# Patient Record
Sex: FEMALE | Race: WHITE | NOT HISPANIC OR LATINO | Employment: UNEMPLOYED | URBAN - NONMETROPOLITAN AREA
[De-identification: names, ages, dates, MRNs, and addresses within clinical notes are randomized per-mention and may not be internally consistent; named-entity substitution may affect disease eponyms.]

---

## 2017-08-14 ENCOUNTER — LAB (OUTPATIENT)
Dept: LAB | Facility: HOSPITAL | Age: 22
End: 2017-08-14

## 2017-08-14 ENCOUNTER — OFFICE VISIT (OUTPATIENT)
Dept: CARDIOLOGY | Facility: CLINIC | Age: 22
End: 2017-08-14

## 2017-08-14 VITALS
OXYGEN SATURATION: 96 % | DIASTOLIC BLOOD PRESSURE: 58 MMHG | SYSTOLIC BLOOD PRESSURE: 102 MMHG | BODY MASS INDEX: 14.28 KG/M2 | HEART RATE: 78 BPM | WEIGHT: 91 LBS | HEIGHT: 67 IN

## 2017-08-14 DIAGNOSIS — R00.2 PALPITATION: ICD-10-CM

## 2017-08-14 DIAGNOSIS — R07.9 CHEST PAIN, UNSPECIFIED TYPE: Primary | ICD-10-CM

## 2017-08-14 DIAGNOSIS — R07.9 CHEST PAIN, UNSPECIFIED TYPE: ICD-10-CM

## 2017-08-14 PROCEDURE — 84443 ASSAY THYROID STIM HORMONE: CPT | Performed by: INTERNAL MEDICINE

## 2017-08-14 PROCEDURE — 99204 OFFICE O/P NEW MOD 45 MIN: CPT | Performed by: INTERNAL MEDICINE

## 2017-08-14 PROCEDURE — 84479 ASSAY OF THYROID (T3 OR T4): CPT | Performed by: INTERNAL MEDICINE

## 2017-08-14 PROCEDURE — 36415 COLL VENOUS BLD VENIPUNCTURE: CPT

## 2017-08-14 PROCEDURE — 84436 ASSAY OF TOTAL THYROXINE: CPT | Performed by: INTERNAL MEDICINE

## 2017-08-14 RX ORDER — FERROUS SULFATE 325(65) MG
325 TABLET ORAL
COMMUNITY
End: 2018-12-27

## 2017-08-14 RX ORDER — MULTIPLE VITAMINS W/ MINERALS TAB 9MG-400MCG
1 TAB ORAL DAILY
COMMUNITY
End: 2018-01-11

## 2017-08-14 NOTE — PROGRESS NOTES
Chief complaint : Chest pain    History of Present Illness this is a very pleasant 22-year-old female who presents today for cardiac evaluation.  Patient stated that she has started to experience chest pains which started approximately 8 months ago.  Patient stated that it started in the beginning of the year.  It has become more frequent and more intense.  The pain intensity can be between 8 and 9 out of 10.  There are no exacerbating or relieving factors.  However the patient can last between 2 minutes and 1 hour.  Patient describes it as sharp and occasionally tightness.  The pain can radiate occasionally to the left and the left breast area.  Occasionally she does experience dyspnea along with the chest discomfort but no nausea or vomiting.  Patient stated that she has noted dizziness when she is active.  Patient stated that she has a sister who is 23 years of age who was told that she has irregular heartbeat.  She has no family history of significant cardiac disease.         Review of Systems   Constitution: Negative. Negative for decreased appetite, diaphoresis, weakness, night sweats, weight gain and weight loss.   HENT: Negative for headaches, hearing loss, nosebleeds and sore throat.    Eyes: Negative.  Negative for blurred vision and photophobia.   Cardiovascular: Positive for chest pain and dyspnea on exertion. Negative for claudication, irregular heartbeat, leg swelling, palpitations, paroxysmal nocturnal dyspnea and syncope.   Respiratory: Negative for cough, hemoptysis, shortness of breath and wheezing.    Endocrine: Negative for cold intolerance, heat intolerance, polydipsia, polyphagia and polyuria.   Hematologic/Lymphatic: Negative.    Skin: Negative for color change, dry skin, flushing, itching and rash.   Musculoskeletal: Negative.  Negative for muscle cramps, muscle weakness and myalgias.   Gastrointestinal: Negative for abdominal pain, change in bowel habit, diarrhea, hematemesis, melena, nausea  and vomiting.   Genitourinary: Negative for dysuria, frequency and hematuria.   Neurological: Positive for dizziness. Negative for focal weakness, light-headedness, loss of balance, numbness and seizures.   Psychiatric/Behavioral: Negative.  Negative for substance abuse, suicidal ideas and thoughts of violence.   Allergic/Immunologic: Negative.        No past medical history on file.    No family history on file.     reports that she has never smoked. She does not have any smokeless tobacco history on file. She reports that she does not drink alcohol or use illicit drugs.    No past surgical history on file.    Allergies   Allergen Reactions   • Cephalosporins    • Risperidone And Related        Current Outpatient Prescriptions   Medication Sig Dispense Refill   • ferrous sulfate 325 (65 FE) MG tablet Take 325 mg by mouth Daily With Breakfast.     • Multiple Vitamins-Minerals (MULTIVITAMIN WITH MINERALS) tablet tablet Take 1 tablet by mouth Daily.       No current facility-administered medications for this visit.        Objective     Vital Signs  Heart Rate:  [78] 78  BP: (102)/(58) 102/58    Physical Exam   Constitutional: She is oriented to person, place, and time. She appears well-developed and well-nourished.   HENT:   Head: Normocephalic and atraumatic.   Eyes: Conjunctivae and EOM are normal. Pupils are equal, round, and reactive to light.   Neck: Neck supple. No JVD present. Carotid bruit is not present. No tracheal deviation and no edema present.   Cardiovascular: Normal rate, regular rhythm, S1 normal, S2 normal, normal heart sounds and intact distal pulses.  Exam reveals no gallop, no S3, no S4 and no friction rub.    No murmur heard.  Pulmonary/Chest: Effort normal and breath sounds normal. She has no wheezes. She has no rales. She exhibits no tenderness.   Abdominal: Bowel sounds are normal. She exhibits no abdominal bruit and no pulsatile midline mass. There is no rebound and no guarding.    Musculoskeletal: Normal range of motion. She exhibits no edema.   Neurological: She is alert and oriented to person, place, and time.   Skin: Skin is warm and dry.   Psychiatric: She has a normal mood and affect.       Procedures    Assessment/Plan     Patient Active Problem List   Diagnosis   • Chest pain   • Palpitation     Plan:  We will obtain a transthoracic echocardiogram to evaluate patient's cardiac structure and function.  I have also ordered a thyroid panel.  We will also consider exercise treadmill stress testing during next visit  We will also consider event monitoring of her cardiac rhythm.    I discussed the patients findings and my recommendations with patient.          This document has been electronically signed by Neil Murcia MD on August 21, 2017 4:43 PM      Neil Murcia MD  08/21/17  4:42 PM

## 2017-08-16 LAB
FT4I SERPL CALC-MCNC: 2 (ref 1.2–4.9)
T3RU NFR SERPL: 34 % (ref 24–39)
T4 SERPL-MCNC: 6 UG/DL (ref 4.5–12)
TSH SERPL-ACNC: 0.88 UIU/ML (ref 0.45–4.5)

## 2017-08-21 PROBLEM — R07.9 CHEST PAIN: Status: ACTIVE | Noted: 2017-08-21

## 2017-08-21 PROBLEM — R00.2 PALPITATION: Status: ACTIVE | Noted: 2017-08-21

## 2017-08-30 LAB
BH CV ECHO MEAS - ACS: 1.8 CM
BH CV ECHO MEAS - AO MAX PG (FULL): 1.8 MMHG
BH CV ECHO MEAS - AO MAX PG: 4.8 MMHG
BH CV ECHO MEAS - AO MEAN PG (FULL): 1 MMHG
BH CV ECHO MEAS - AO MEAN PG: 3 MMHG
BH CV ECHO MEAS - AO ROOT AREA: 4.5 CM^2
BH CV ECHO MEAS - AO ROOT DIAM: 2.4 CM
BH CV ECHO MEAS - AO V2 MAX: 109 CM/SEC
BH CV ECHO MEAS - AO V2 MEAN: 78.9 CM/SEC
BH CV ECHO MEAS - AO V2 VTI: 21.9 CM
BH CV ECHO MEAS - AVA(I,A): 1.8 CM^2
BH CV ECHO MEAS - AVA(I,D): 1.8 CM^2
BH CV ECHO MEAS - AVA(V,A): 2 CM^2
BH CV ECHO MEAS - AVA(V,D): 2 CM^2
BH CV ECHO MEAS - EDV(CUBED): 50.7 ML
BH CV ECHO MEAS - EDV(TEICH): 58.1 ML
BH CV ECHO MEAS - EF(CUBED): 69.2 %
BH CV ECHO MEAS - EF(TEICH): 61.6 %
BH CV ECHO MEAS - ESV(CUBED): 15.6 ML
BH CV ECHO MEAS - ESV(TEICH): 22.3 ML
BH CV ECHO MEAS - FS: 32.4 %
BH CV ECHO MEAS - IVS/LVPW: 1
BH CV ECHO MEAS - IVSD: 0.6 CM
BH CV ECHO MEAS - LA DIMENSION: 2.2 CM
BH CV ECHO MEAS - LA/AO: 0.92
BH CV ECHO MEAS - LV MASS(C)D: 56.3 GRAMS
BH CV ECHO MEAS - LV MAX PG: 2.9 MMHG
BH CV ECHO MEAS - LV MEAN PG: 2 MMHG
BH CV ECHO MEAS - LV V1 MAX: 85.7 CM/SEC
BH CV ECHO MEAS - LV V1 MEAN: 57.8 CM/SEC
BH CV ECHO MEAS - LV V1 VTI: 15.9 CM
BH CV ECHO MEAS - LVIDD: 3.7 CM
BH CV ECHO MEAS - LVIDS: 2.5 CM
BH CV ECHO MEAS - LVOT AREA (M): 2.5 CM^2
BH CV ECHO MEAS - LVOT AREA: 2.5 CM^2
BH CV ECHO MEAS - LVOT DIAM: 1.8 CM
BH CV ECHO MEAS - LVPWD: 0.6 CM
BH CV ECHO MEAS - MV A MAX VEL: 70.6 CM/SEC
BH CV ECHO MEAS - MV DEC SLOPE: 922 CM/SEC^2
BH CV ECHO MEAS - MV E MAX VEL: 91.8 CM/SEC
BH CV ECHO MEAS - MV E/A: 1.3
BH CV ECHO MEAS - MV MAX PG: 5.9 MMHG
BH CV ECHO MEAS - MV MEAN PG: 2 MMHG
BH CV ECHO MEAS - MV P1/2T MAX VEL: 120 CM/SEC
BH CV ECHO MEAS - MV P1/2T: 38.1 MSEC
BH CV ECHO MEAS - MV V2 MAX: 121 CM/SEC
BH CV ECHO MEAS - MV V2 MEAN: 56.3 CM/SEC
BH CV ECHO MEAS - MV V2 VTI: 16.4 CM
BH CV ECHO MEAS - MVA P1/2T LCG: 1.8 CM^2
BH CV ECHO MEAS - MVA(P1/2T): 5.8 CM^2
BH CV ECHO MEAS - MVA(VTI): 2.5 CM^2
BH CV ECHO MEAS - PA MAX PG: 6.6 MMHG
BH CV ECHO MEAS - PA V2 MAX: 128 CM/SEC
BH CV ECHO MEAS - RAP SYSTOLE: 5 MMHG
BH CV ECHO MEAS - RVDD: 1.8 CM
BH CV ECHO MEAS - SV(AO): 99.1 ML
BH CV ECHO MEAS - SV(CUBED): 35 ML
BH CV ECHO MEAS - SV(LVOT): 40.5 ML
BH CV ECHO MEAS - SV(TEICH): 35.8 ML

## 2017-09-07 ENCOUNTER — TELEPHONE (OUTPATIENT)
Dept: CARDIOLOGY | Facility: CLINIC | Age: 22
End: 2017-09-07

## 2018-01-10 DIAGNOSIS — R07.9 CHEST PAIN, UNSPECIFIED TYPE: Primary | ICD-10-CM

## 2018-01-11 ENCOUNTER — OFFICE VISIT (OUTPATIENT)
Dept: CARDIOLOGY | Facility: CLINIC | Age: 23
End: 2018-01-11

## 2018-01-11 VITALS
HEIGHT: 67 IN | OXYGEN SATURATION: 96 % | DIASTOLIC BLOOD PRESSURE: 60 MMHG | SYSTOLIC BLOOD PRESSURE: 100 MMHG | HEART RATE: 68 BPM | WEIGHT: 94.3 LBS | BODY MASS INDEX: 14.8 KG/M2

## 2018-01-11 DIAGNOSIS — R07.2 PRECORDIAL PAIN: Primary | ICD-10-CM

## 2018-01-11 PROCEDURE — 93000 ELECTROCARDIOGRAM COMPLETE: CPT | Performed by: INTERNAL MEDICINE

## 2018-01-11 PROCEDURE — 99212 OFFICE O/P EST SF 10 MIN: CPT | Performed by: INTERNAL MEDICINE

## 2018-01-11 NOTE — PROGRESS NOTES
Cardiovascular Medicine   Gerry Carey M.D., Ph.D., Merged with [facility]      The patient returns to cardiology clinic for follow-up of the following cardiac problems:    PROBLEM LIST:  1. CPS  2. Raynaud's    CPS  This is a former patient of Dr. Murcia.  She returns in follow-up today to establish care in my office.  She was seen for noncardiac chest pain.  She was initially seen in the emergency department.  She was complaining of chest pain for duration of over 4 months.  Laboratory evaluations are unremarkable.  Chest x-ray was unremarkable.  He recommended a structural evaluation with the TTE.  Her echocardiogram was normal.  She was contacted by her prior cardiologist's nurse with the results of her echocardiogram.  Apparently, this appointment was scheduled today for routine follow-up. She has no symptoms at this time. She has no family history of congenital disease, EP disease of SCA.    Review of Systems - History obtained from chart review and the patient  General ROS: negative  Cardiovascular ROS: no chest pain or dyspnea on exertion.  All other systems were reviewed and were negative.  family history is not on file.   reports that she has never smoked. She does not have any smokeless tobacco history on file. She reports that she does not drink alcohol or use illicit drugs.  Allergies   Allergen Reactions   • Cephalosporins    • Risperidone And Related        Current Outpatient Prescriptions:   •  ferrous sulfate 325 (65 FE) MG tablet, Take 325 mg by mouth Daily With Breakfast., Disp: , Rfl:   •  Multiple Vitamins-Minerals (MULTIVITAMIN WITH MINERALS) tablet tablet, Take 1 tablet by mouth Daily., Disp: , Rfl:     Physical Exam:  Vitals:    01/11/18 1451   BP: 100/60   Pulse: 68   SpO2: 96%     Body mass index is 14.77 kg/(m^2).    GEN: alert, appears stated age and cooperative  Body Habitus: thin  JVP: 6 cm of water at 45 degrees HJR: absent      Heart rate: normal  Heart Rhythm: regular     Heart Sounds: S1:  "normal intensity  S2: normal intensity  S3: absent   S4: absent  Opening Snap: absent  A2-OS:  N/A  Pericardial rub: absent    Ejection click: None      Murmurs: Systolic: none  Diastolic: none      DATA REVIEWED:     EKG: normal EKG, normal sinus rhythm.                  Assessment/Plan      1. Non-cardiac CP.  Patient has no risk factors for the development of obstructive CAD.  She is low-risk for CAD and does not merit an ischemia evaluation.  Chest x-ray on her initial presentation was unremarkable.  Echocardiogram is excluded structural heart disease.  Based on her symptomatology I do not believe this represents cardiac chest pain. She is currently asymptomatic. She does have a body type that I would characterize as \"Marfanoid,\" but her TTE, as interpreted by Dr. Murcia, had normal aortic dimensions. Clearly, if she has ongoing issues we could consider a coronary CTA to exclude coronary anomalies, but since she is currently asymptomatic, I do not think this is warranted at this time.   -PCP follow-up    2. Tobacco status: Never smoker.    Plan for follow-up: The patient was provided with no specific follow-up in this office.          This document has been electronically signed by Gerry Carey MD PhD on January 11, 2018 2:56 PM        "

## 2019-01-02 ENCOUNTER — OFFICE VISIT (OUTPATIENT)
Dept: CARDIOLOGY | Facility: CLINIC | Age: 24
End: 2019-01-02

## 2019-01-02 ENCOUNTER — TELEPHONE (OUTPATIENT)
Dept: GENERAL RADIOLOGY | Facility: HOSPITAL | Age: 24
End: 2019-01-02

## 2019-01-02 ENCOUNTER — PROCEDURE VISIT (OUTPATIENT)
Dept: PULMONOLOGY | Facility: CLINIC | Age: 24
End: 2019-01-02

## 2019-01-02 ENCOUNTER — OFFICE VISIT (OUTPATIENT)
Dept: PULMONOLOGY | Facility: CLINIC | Age: 24
End: 2019-01-02

## 2019-01-02 ENCOUNTER — APPOINTMENT (OUTPATIENT)
Dept: GENERAL RADIOLOGY | Facility: HOSPITAL | Age: 24
End: 2019-01-02
Attending: INTERNAL MEDICINE

## 2019-01-02 VITALS
BODY MASS INDEX: 14.22 KG/M2 | HEIGHT: 67 IN | HEART RATE: 67 BPM | OXYGEN SATURATION: 97 % | SYSTOLIC BLOOD PRESSURE: 108 MMHG | WEIGHT: 90.61 LBS | DIASTOLIC BLOOD PRESSURE: 62 MMHG

## 2019-01-02 VITALS
HEART RATE: 50 BPM | HEIGHT: 67 IN | BODY MASS INDEX: 14.24 KG/M2 | WEIGHT: 90.7 LBS | OXYGEN SATURATION: 97 % | SYSTOLIC BLOOD PRESSURE: 104 MMHG | DIASTOLIC BLOOD PRESSURE: 71 MMHG

## 2019-01-02 DIAGNOSIS — R07.2 PRECORDIAL PAIN: Primary | ICD-10-CM

## 2019-01-02 DIAGNOSIS — R07.89 CHEST TIGHTNESS: Primary | ICD-10-CM

## 2019-01-02 DIAGNOSIS — R55 SYNCOPE, UNSPECIFIED SYNCOPE TYPE: ICD-10-CM

## 2019-01-02 DIAGNOSIS — R00.2 PALPITATION: ICD-10-CM

## 2019-01-02 DIAGNOSIS — R42 DIZZINESS: ICD-10-CM

## 2019-01-02 PROCEDURE — 99204 OFFICE O/P NEW MOD 45 MIN: CPT | Performed by: INTERNAL MEDICINE

## 2019-01-02 PROCEDURE — 99214 OFFICE O/P EST MOD 30 MIN: CPT | Performed by: INTERNAL MEDICINE

## 2019-01-02 PROCEDURE — 93000 ELECTROCARDIOGRAM COMPLETE: CPT | Performed by: INTERNAL MEDICINE

## 2019-01-02 PROCEDURE — 94060 EVALUATION OF WHEEZING: CPT | Performed by: INTERNAL MEDICINE

## 2019-01-02 NOTE — PROGRESS NOTES
Pulmonary Consultation    Allison Christianson, MAN,    Thank you for asking me to see Kathya Montano for   Chief Complaint   Patient presents with   • Chest Tightness   .    Subjective     History of Present Illness  Kathya Montano is a 23 y.o. female with no significant PMH who presents for evaluation of chest tightness. Pt states she has been having chest tightness and episodes of dizziness with diaphoresis for over 2 years. She has also had episodes of blacking out which began over a year ago. Pt reports she feels that her chest gets tight then she starts sweating followed by brief loss of conscioness. This has been witnessed and only last a few seconds. When she arouses she feels groggy but has resolution of her symptoms. She has seen her PCP and UC for these episodes. Pt has been evaluated by cardiology and ENT in the past. She had an echo and EKG which were normal. Her episodes are unpredictable and not brought on by any activities. She denies dyspnea but when she has chest tightness she has trouble breathing. Pt denies prior diagnosis of lung disease, wheeze, cough, or wt changes. She has been told she had early vertigo and mild orthostasis but she has not been treated for either.  Of note, the patient admits that she has several albuterol inhalers which she has tried her episodes and denies preceding any benefit.  Pt denies depression or anxiety. She is a never smoker. Pt has worked as a  and a . Her grandmother and uncle have asthma. There are no pets at home or recent travel.    Review of Systems: History obtained from chart review and the patient.  Review of Systems   Constitutional: Positive for appetite change and fatigue. Negative for fever and unexpected weight change.        Night sweats   HENT: Positive for congestion, hearing loss and postnasal drip.    Respiratory: Positive for chest tightness and shortness of breath. Negative for cough and wheezing.    Cardiovascular:  "Negative for chest pain and leg swelling.   Gastrointestinal: Positive for abdominal pain and nausea.   Musculoskeletal: Positive for arthralgias and back pain.   Neurological: Positive for dizziness, syncope and headaches.     As described in the HPI. Otherwise, remainder of ROS (14 systems) were negative.    Patient Active Problem List   Diagnosis   • Chest pain   • Palpitation   • Dizziness       No current outpatient medications on file.    Past Medical History:   Diagnosis Date   • Chest tightness      History reviewed. No pertinent surgical history.  Social History     Socioeconomic History   • Marital status: Single     Spouse name: Not on file   • Number of children: Not on file   • Years of education: Not on file   • Highest education level: Not on file   Tobacco Use   • Smoking status: Never Smoker   • Smokeless tobacco: Never Used   Substance and Sexual Activity   • Alcohol use: No   • Drug use: No   • Sexual activity: No     Family History   Problem Relation Age of Onset   • Asthma Maternal Uncle    • Diabetes Maternal Uncle    • Asthma Maternal Grandmother    • Diabetes Maternal Grandmother    • Cancer Maternal Grandfather           Objective     Blood pressure 104/71, pulse 50, height 170.2 cm (67\"), weight 41.1 kg (90 lb 11.2 oz), last menstrual period 12/03/2018, SpO2 97 %, not currently breastfeeding.  Physical Exam   Constitutional: She is oriented to person, place, and time. Vital signs are normal. She appears well-developed and well-nourished.   Lean   HENT:   Head: Normocephalic and atraumatic.   Nose: Nose normal.   Mouth/Throat: Uvula is midline, oropharynx is clear and moist and mucous membranes are normal.   Mallampati 1, mild cobbestoning   Eyes: Conjunctivae, EOM and lids are normal. Pupils are equal, round, and reactive to light.   Neck: Trachea normal and normal range of motion. No tracheal tenderness present. No thyroid mass present.   Cardiovascular: Normal rate, regular rhythm and " normal heart sounds. PMI is not displaced. Exam reveals no gallop.   No murmur heard.  Pulmonary/Chest: Effort normal and breath sounds normal. No respiratory distress. She has no decreased breath sounds. She has no wheezes. She has no rhonchi. Chest wall is not dull to percussion. She exhibits no tenderness.   Abdominal: Soft. Normal appearance and bowel sounds are normal. There is no hepatomegaly. There is no tenderness.   Musculoskeletal:   Normal gait, no extremity edema     Vascular Status -  Her right foot exhibits no edema. Her left foot exhibits no edema.  Lymphadenopathy:        Head (right side): No submandibular adenopathy present.        Head (left side): No submandibular adenopathy present.     She has no cervical adenopathy.        Right: No supraclavicular adenopathy present.        Left: No supraclavicular adenopathy present.   Neurological: She is alert and oriented to person, place, and time.   Skin: Skin is warm and dry. No rash noted. No cyanosis. Nails show no clubbing.   Psychiatric: She has a normal mood and affect. Her speech is normal and behavior is normal. Judgment normal.   Nursing note and vitals reviewed.      PFTs: 1/2/19 (independently reviewed and interpreted by me)  Ratio 99  FVC 2.36/ 56%  FEV1 2.34/ 65%  Reduced FVC and FEV1 suggesting restriction.  No significant bronchodilator response.  No comparative data available.    IMAGING: CXR 5/11/17 (independently reviewed and interpreted by me) showed NACPD    TTE 8/28/17:  · Left ventricular systolic function is normal.  · Left ventricular diastolic function is normal.  · All left ventricular wall segments contract normally  · Estimated EF appears to be in the range of 61 - 65%     Assessment/Plan     Kathya was seen today for chest tightness.    Diagnoses and all orders for this visit:    Chest tightness  -     XR Chest 2 View    Syncope, unspecified syncope type    Dizziness         Discussion/ Recommendations:   Spirometry shows  reduction in both FVC and FEV1 suggesting restriction, but there was no obstruction or bronchodilator reversibility.  Chest x-ray was normal.  The patient's description of symptoms as well as previous trial of albuterol with no benefit, do not seem consistent with asthma or a primary pulmonary etiology.  The differential for her episodes I think includes orthostasis, vasovagal syncope, or possibly anxiety.  I think is reasonable to have an evaluation with cardiology, but at most would only expect a cardiac monitor or possibly tilt table testing.    -Recommended that she try albuterol when she initially feels chest tightness to assess response and frequency of use.  -Counseled on changing positions as well as increasing movement when she has been standing still for an extended period to prevent these episodes.  -Consider reevaluation with ENT for assessment of vertigo.  -May benefit for evaluation of underlying anxiety as the cause of her episodes.  Defer to PCP.    Patient's Body mass index is 14.21 kg/m². BMI is below normal parameters. Recommendations include: no follow-up required.           Return in about 6 weeks (around 2/13/2019) for Recheck chest tightness.      Thank you for allowing me to participate in the care of Kathya Montano. Please do not hesitate to contact me with any questions.         This document has been electronically signed by Rand Mendoza MD on January 2, 2019 10:39 AM      Dictated using Dragon

## 2019-01-02 NOTE — PATIENT INSTRUCTIONS
Cardiac Event Monitoring  A cardiac event monitor is a small recording device that is used to detect abnormal heart rhythms (arrhythmias). The monitor is used to record your heart rhythm when you have symptoms, such as:  · Fast heartbeats (palpitations), such as heart racing or fluttering.  · Dizziness.  · Fainting or light-headedness.  · Unexplained weakness.    Some monitors are wired to electrodes placed on your chest. Electrodes are flat, sticky disks that attach to your skin. Other monitors may be hand-held or worn on the wrist. The monitor can be worn for up to 30 days.  If the monitor is attached to your chest, a technician will prepare your chest for the electrode placement and show you how to work the monitor. Take time to practice using the monitor before you leave the office. Make sure you understand how to send the information from the monitor to your health care provider. In some cases, you may need to use a landline telephone instead of a cell phone.  What are the risks?  Generally, this device is safe to use, but it possible that the skin under the electrodes will become irritated.  How to use your cardiac event monitor  · Wear your monitor at all times, except when you are in water:  ? Do not let the monitor get wet.  ? Take the monitor off when you bathe. Do not swim or use a hot tub with it on.  · Keep your skin clean. Do not put body lotion or moisturizer on your chest.  · Change the electrodes as told by your health care provider or any time they stop sticking to your skin. You may need to use medical tape to keep them on.  · Try to put the electrodes in slightly different places on your chest to help prevent skin irritation. They must remain in the area under your left breast and in the upper right section of your chest.  · Make sure the monitor is safely clipped to your clothing or in a location close to your body that your health care provider recommends.  · Press the button to record as soon  as you feel heart-related symptoms, such as:  ? Dizziness.  ? Weakness.  ? Light-headedness.  ? Palpitations.  ? Thumping or pounding in your chest.  ? Shortness of breath.  ? Unexplained weakness.  · Keep a diary of your activities, such as walking, doing chores, and taking medicine. It is very important to note what you were doing when you pushed the button to record your symptoms. This will help your health care provider determine what might be contributing to your symptoms.  · Send the recorded information as recommended by your health care provider. It may take some time for your health care provider to process the results.  · Change the batteries as told by your health care provider.  · Keep electronic devices away from your monitor. This includes:  ? Tablets.  ? [employer] players.  ? Cell phones.  · While wearing your monitor you should avoid:  ? Electric blankets.  ? Electric razors.  ? Electric toothbrushes.  ? Microwave ovens.  ? Magnets.  ? Metal detectors.  Get help right away if:  · You have chest pain.  · You have extreme difficulty breathing or shortness of breath.  · You develop a very fast heartbeat that persists.  · You develop dizziness that does not go away.  · You faint or constantly feel like you are about to faint.  Summary  · A cardiac event monitor is a small recording device that is used to help detect abnormal heart rhythms (arrhythmias).  · The monitor is used to record your heart rhythm when you have heart-related symptoms.  · Make sure you understand how to send the information from the monitor to your health care provider.  · It is important to press the button on the monitor when you have any heart-related symptoms.  · Keep a diary of your activities, such as walking, doing chores, and taking medicine. It is very important to note what you were doing when you pushed the button to record your symptoms. This will help your health care provider learn what might be causing your symptoms.  This  information is not intended to replace advice given to you by your health care provider. Make sure you discuss any questions you have with your health care provider.  Document Released: 09/26/2009 Document Revised: 12/02/2017 Document Reviewed: 12/02/2017  [employer] Interactive Patient Education © 2017 [employer]  Tilt Table Test  A tilt table test is a test to find the cause of unexplained problems, such as:  · Fainting.  · Dizziness.  · Falls.  · A drop in blood pressure when standing up (orthostatic hypotension).    For this test, you will be safely secured to a table that moves you from a lying position to an upright position. Your heart rhythm and blood pressure will be monitored during the test. While having this test, it is normal to:  · Feel lightheaded or dizzy.  · Faint.  · Feel nauseous or vomit.  · Have blurry vision.  · Feel cold or clammy.    Tell a health care provider about:  · All medicines you are taking, including vitamins, herbs, eye drops, creams, and over-the-counter medicines.  · Any surgeries you have had.  · Any medical conditions you have.  · Whether you are pregnant or may be pregnant.  What are the risks?  There are no risks or complications associated with this test.  What happens before the procedure?  · Follow instructions from your health care provider about eating or drinking restrictions.  · Ask your health care provider about changing or stopping your regular medicines. This is especially important if you are taking diabetes medicines or blood thinners.  · Let your primary health care provider know that you are having this test.  · Plan to have someone take you home after the test.  What happens during the procedure?  · You will be asked to lie down on a table. The table will have a footboard and safety straps to keep you in place.  · An IV tube will be inserted into a vein in your hand or arm.  · Your blood pressure, heart rate, breathing rate, and blood oxygen level will be  monitored throughout the test.  · While you are lying down:  ? A stethoscope will be used to listen to arteries in your neck (carotid arteries) to check for unusual sounds (bruits).  ? Your carotid arteries will be pressed on one at a time, for several seconds each.  · You will be placed in an upright position. You will need to tell your health care provider right away if you feel dizzy or like you are going to faint.  · If you feel dizzy, your blood pressure drops, or your heart rate drops, you will be placed in a flat or head-down position. If your heart rate or blood pressure does not return to normal after that, you may be given medicine to help with symptoms of low blood pressure or a slow heart rate. The medicine may be given under your tongue or through the IV tube.  · If you do not have symptoms when placed in an upright position, medicine may be given to make you feel dizzy. The medicine may be given under your tongue or through the IV tube.  This test may vary among health care providers and hospitals.  What happens after the procedure?  · Your health care provider will tell you when may go home.  · It is your responsibility to get your test results. Ask your health care provider or the department performing the test when your results will be ready.  This information is not intended to replace advice given to you by your health care provider. Make sure you discuss any questions you have with your health care provider.  Document Released: 11/29/2005 Document Revised: 08/20/2017 Document Reviewed: 09/10/2016  [employer] Patient Education © 2018 [employer]

## 2019-01-02 NOTE — PROGRESS NOTES
Cardiovascular Medicine   Gerry Carey M.D., Ph.D., [facility]      The patient returns to cardiology clinic for follow-up of the following cardiac problems:    PROBLEM LIST:  1. CPS  2. Raynaud's  3. Syncope  4. Palpitations    This is a former patient of Dr. Murcia.  She was seen for non-cardiac chest pain.  She was initially seen in the emergency department.  She was complaining of chest pain for duration of over 4 months.  Laboratory evaluations were unremarkable.  Chest x-ray was unremarkable.  He recommended a structural evaluation with TTE.  Her echocardiogram was normal.  I last saw her in follow-up.  She was asymptomatic at that time from a cardiovascular standpoint so she was discharged from the clinic.  She returns today at the request of primary care for episodes of chest tightness.  Of note, she was evaluated by pulmonary earlier today. She has CXR and PFTs pending. She has had intermittent chest tightness. She has had some dizziness and lightheadedness. No postural component. She has had palpitations. She has had several episodes of syncope. No trauma. She also has preceding dizziness and lightheadness.     Review of Systems   Cardiovascular: Positive for chest pain and dyspnea on exertion. Negative for claudication, cyanosis, irregular heartbeat, leg swelling, near-syncope, orthopnea, palpitations, paroxysmal nocturnal dyspnea and syncope.   Respiratory: Positive for shortness of breath. Negative for cough, hemoptysis, sleep disturbances due to breathing, snoring, sputum production and wheezing.    Neurological: Positive for light-headedness.     family history includes Asthma in her maternal grandmother and maternal uncle; Cancer in her maternal grandfather; Diabetes in her maternal grandmother and maternal uncle.   reports that  has never smoked. she has never used smokeless tobacco. She reports that she does not drink alcohol or use drugs.  Allergies   Allergen Reactions   • Ceclor [Cefaclor]     • Cephalosporins    • Risperidone And Related      No current outpatient medications on file.    Physical Exam:  Vitals:    01/02/19 1008   BP: 108/62   Pulse: 67   SpO2: 97%     Body mass index is 14.19 kg/m².    GEN: alert, appears stated age and cooperative  Body Habitus: thin  JVP: 6 cm of water at 45 degrees HJR: absent      Heart rate: normal  Heart Rhythm: regular     Heart Sounds: S1: normal intensity  S2: normal intensity  S3: absent   S4: absent  Opening Snap: absent  A2-OS:  N/A  Pericardial rub: absent    Ejection click: None      Murmurs: Systolic: none  Diastolic: none      DATA REVIEWED:         Results for orders placed in visit on 08/14/17   Adult Transthoracic Echo Complete    Narrative · Left ventricular systolic function is normal.  · Left ventricular diastolic function is normal.  · All left ventricular wall segments contract normally  · Estimated EF appears to be in the range of 61 - 65%.              Assessment/Plan       Diagnosis Plan   1. Precordial pain.  She's had a normal chest x-ray in the past, as well as a normal TTE.  It's difficult to delineate her chest tightness from breathing complaints.  She does have PFTs pending from pulmonary medicine.  Again, she does not need an ischemia evaluation at this point.  I'm more concerned that her chest tightness is being related to possibly OH  · As below    2. Dizziness.  I could not elicit a postural component.  However, it is possible that she has OH.  I offered her testing for this, but did advise her that I do not treat OH.   Tilt Table, Holter monitor    3. Palpitation  Holter Monitor - 72 Hour Up To 21 Days         Return in about 7 weeks (around 2/20/2019).

## 2019-01-03 NOTE — TELEPHONE ENCOUNTER
Pt: Kathya Montano, : 1995, did not show up for her XR Chest 2 View appointment on 2019 at 10:00am.

## 2019-01-07 ENCOUNTER — HOSPITAL ENCOUNTER (OUTPATIENT)
Dept: CARDIOLOGY | Facility: HOSPITAL | Age: 24
Discharge: HOME | End: 2019-01-07
Attending: INTERNAL MEDICINE | Admitting: INTERNAL MEDICINE

## 2019-01-07 LAB
MAXIMAL PREDICTED HEART RATE: 197 BPM
STRESS TARGET HR: 167 BPM

## 2019-01-07 PROCEDURE — 93660 TILT TABLE EVALUATION: CPT

## 2019-01-07 PROCEDURE — 93660 TILT TABLE EVALUATION: CPT | Performed by: INTERNAL MEDICINE

## 2019-02-12 NOTE — PROGRESS NOTES
Pulmonary Office Follow-up    Kathya Montano is seen in the office today for   Chief Complaint   Patient presents with   • Follow-up     chest tightness   .    Subjective     History of Present Illness  Kathya Montano is a 23 y.o. female with no significant PMH who presents for follow-up of chest tightness.  She was last seen on 1/2/19, at which time I recommended that she try using albuterol when she first feels chest discomfort to assess relief, and encouraged her to positions when she her symptoms.  She states that since her last visit she has not had any further episodes of syncope, but she does continue to have intermittent episodes of chest tightness.  When she has the sensation she will sit down until the chest tightness is relieved and that she is able to go back to her previous activity.  She does report that she tried the albuterol but not noticed any changes with her chest tightness.    Review of Systems: History obtained from chart review and the patient.  Review of Systems   Constitutional: Positive for fatigue. Negative for fever and unexpected weight change.   HENT: Positive for hearing loss.    Respiratory: Positive for chest tightness and shortness of breath. Negative for cough and wheezing.    Cardiovascular: Negative for chest pain and leg swelling.     As described in the HPI. Otherwise, remainder of ROS (14 systems) were negative.    Patient Active Problem List   Diagnosis   • Chest pain   • Palpitation   • Dizziness       No current outpatient medications on file.     Allergies   Allergen Reactions   • Ceclor [Cefaclor]    • Cephalosporins    • Risperidone And Related        Past Medical History:   Diagnosis Date   • Chest tightness      History reviewed. No pertinent surgical history.  Social History     Socioeconomic History   • Marital status: Single     Spouse name: Not on file   • Number of children: Not on file   • Years of education: Not on file   • Highest education level:  "Not on file   Tobacco Use   • Smoking status: Never Smoker   • Smokeless tobacco: Never Used   Substance and Sexual Activity   • Alcohol use: No   • Drug use: No   • Sexual activity: No     Family History   Problem Relation Age of Onset   • Asthma Maternal Uncle    • Diabetes Maternal Uncle    • Asthma Maternal Grandmother    • Diabetes Maternal Grandmother    • Cancer Maternal Grandfather           Objective     Blood pressure 108/71, pulse 90, height 170.2 cm (67\"), weight 40.8 kg (89 lb 14.4 oz), SpO2 98 %, not currently breastfeeding.  Physical Exam   Constitutional: She is oriented to person, place, and time. Vital signs are normal. She appears well-developed and well-nourished.   Lean   HENT:   Head: Normocephalic and atraumatic.   Nose: Nose normal.   Mouth/Throat: Uvula is midline, oropharynx is clear and moist and mucous membranes are normal.   Mallampati 1, mild cobbestoning   Eyes: Conjunctivae, EOM and lids are normal. Pupils are equal, round, and reactive to light.   Neck: Trachea normal and normal range of motion. No tracheal tenderness present. No thyroid mass present.   Cardiovascular: Normal rate, regular rhythm and normal heart sounds. PMI is not displaced. Exam reveals no gallop.   No murmur heard.  Pulmonary/Chest: Effort normal and breath sounds normal. No respiratory distress. She has no decreased breath sounds. She has no wheezes. She has no rhonchi. She exhibits no tenderness.   Abdominal: Soft. Normal appearance and bowel sounds are normal. There is no hepatomegaly. There is no tenderness.   Musculoskeletal:   Normal gait, no extremity edema     Vascular Status -  Her right foot exhibits no edema. Her left foot exhibits no edema.  Lymphadenopathy:        Head (right side): No submandibular adenopathy present.        Head (left side): No submandibular adenopathy present.     She has no cervical adenopathy.        Right: No supraclavicular adenopathy present.        Left: No supraclavicular " adenopathy present.   Neurological: She is alert and oriented to person, place, and time.   Skin: Skin is warm and dry. No rash noted. No cyanosis. Nails show no clubbing.   Psychiatric: She has a normal mood and affect. Her speech is normal and behavior is normal. Judgment normal.   Nursing note and vitals reviewed.      PFTs: 1/2/19 (independently reviewed and interpreted by me)  Ratio 99  FVC 2.36/ 56%  FEV1 2.34/ 65%  Reduced FVC and FEV1 suggesting restriction.  No significant bronchodilator response.  No comparative data available.    Holter 1/31/19:  · A normal monitor study.  · Patient submitted 10 symptoms that correlated with sinus mechanism    Tilt Table 1/7/19:  · Results consistent with postural tachycardia syndrome without evidence of hypotension     Assessment/Plan     Kathya was seen today for follow-up.    Diagnoses and all orders for this visit:    Precordial pain    Dizziness    Postural orthostatic tachycardia syndrome         Discussion/ Recommendations:   I do not think the patient has any underlying lung diseases as a cause of her chest tightness, especially she did not perceive any benefit from albuterol use.  She did have a tilt table test which showed postural tachycardia syndrome and this certainly could account for her episodes.  At this time, I do not recommend any additional bronchodilators, and have encouraged her to follow-up with Dr. Carey to discuss results of her recent tilt table.    -Okay to stop albuterol.  -Again, encouraged her to change positions slowly to prevent onset of symptoms.  -May benefit for evaluation of underlying anxiety as the cause of her episodes.  Defer to PCP.  -Follow-up with Dr. Carey to review recent tilt table as well as recommendations on POTS.    Patient's Body mass index is 14.08 kg/m². BMI is below normal parameters. Recommendations include: no follow-up required.           Return if symptoms worsen or fail to improve.      Thank you for  allowing me to participate in the care of Kathya Montano. Please do not hesitate to contact me with any questions.         This document has been electronically signed by Rand Mendoza MD on February 13, 2019 11:06 AM      Dictated using Dragon

## 2019-02-13 ENCOUNTER — OFFICE VISIT (OUTPATIENT)
Dept: PULMONOLOGY | Facility: CLINIC | Age: 24
End: 2019-02-13

## 2019-02-13 VITALS
HEART RATE: 90 BPM | WEIGHT: 89.9 LBS | BODY MASS INDEX: 14.11 KG/M2 | HEIGHT: 67 IN | OXYGEN SATURATION: 98 % | SYSTOLIC BLOOD PRESSURE: 108 MMHG | DIASTOLIC BLOOD PRESSURE: 71 MMHG

## 2019-02-13 DIAGNOSIS — R42 DIZZINESS: ICD-10-CM

## 2019-02-13 DIAGNOSIS — R07.2 PRECORDIAL PAIN: Primary | ICD-10-CM

## 2019-02-13 DIAGNOSIS — G90.A POSTURAL ORTHOSTATIC TACHYCARDIA SYNDROME: ICD-10-CM

## 2019-02-13 PROCEDURE — 99213 OFFICE O/P EST LOW 20 MIN: CPT | Performed by: INTERNAL MEDICINE

## 2019-02-27 ENCOUNTER — OFFICE VISIT (OUTPATIENT)
Dept: CARDIOLOGY | Facility: CLINIC | Age: 24
End: 2019-02-27

## 2019-02-27 VITALS
DIASTOLIC BLOOD PRESSURE: 60 MMHG | SYSTOLIC BLOOD PRESSURE: 100 MMHG | HEIGHT: 67 IN | OXYGEN SATURATION: 98 % | BODY MASS INDEX: 14.19 KG/M2 | WEIGHT: 90.4 LBS | HEART RATE: 89 BPM

## 2019-02-27 DIAGNOSIS — R07.2 PRECORDIAL PAIN: Primary | ICD-10-CM

## 2019-02-27 DIAGNOSIS — R00.0 SINUS TACHYCARDIA: ICD-10-CM

## 2019-02-27 DIAGNOSIS — R42 DIZZINESS: ICD-10-CM

## 2019-02-27 DIAGNOSIS — R00.2 PALPITATION: ICD-10-CM

## 2019-02-27 PROCEDURE — 99214 OFFICE O/P EST MOD 30 MIN: CPT | Performed by: INTERNAL MEDICINE

## 2019-02-27 NOTE — PROGRESS NOTES
Cardiovascular Medicine   Gerry Carey M.D., Ph.D., [facility]      The patient returns to cardiology clinic for follow-up of the following cardiac problems:    PROBLEM LIST:  1. CPS  2. Raynaud's  3. Syncope  4. Palpitations    Kathya Montano is a 23 y.o. female former patient of Dr. Murcia.  She was seen for non-cardiac chest pain.  She was initially seen in the emergency department.  She was complaining of chest pain for duration of over 4 months.  Laboratory evaluations were unremarkable.  Chest x-ray was unremarkable.  He recommended a structural evaluation with TTE.  Her echocardiogram was normal.  I last saw her in follow-up.  She was asymptomatic at that time from a cardiovascular standpoint so she was discharged from the clinic.  She was then sent back at her last appointment at the request of primary care for episodes of chest tightness.  She was continuing to have intermittent episodes of chest tightness.  She was complaining of increased cardiac awareness.  This was characterized as palpitations.  She was having intermittent dizziness and lightheadedness.  She also reported preceding dizziness and lightheadedness with several episodes of syncope.  She had a 2D echocardiogram by her former cardiologist that was structurally normal.  I was suspicious that she may have developed POTS.  I recommended an extended monitor and a heads-up tilt table.  Her monitor was normal.  Her average heart rate was normal.  She submitted several symptoms that correlated with sinus mechanism only.  Her ALEJANDRA had no documented episodes of hypotension.  However, her resting heart rate accelerated to over 106 during the upright position which was an abnormal response.  She presents today for results.  We went over these results.  Unfortunately, she is continued to have ongoing dizziness, lightheadedness and increased cardiac awareness.  No syncope since her last appointment.    Review of Systems   Cardiovascular: Positive  for chest pain and dyspnea on exertion. Negative for claudication, cyanosis, irregular heartbeat, leg swelling, near-syncope, orthopnea, palpitations, paroxysmal nocturnal dyspnea and syncope.   Respiratory: Positive for shortness of breath. Negative for cough, hemoptysis, sleep disturbances due to breathing, snoring, sputum production and wheezing.    Neurological: Positive for light-headedness.     family history includes Asthma in her maternal grandmother and maternal uncle; Cancer in her maternal grandfather; Diabetes in her maternal grandmother and maternal uncle.   reports that  has never smoked. she has never used smokeless tobacco. She reports that she does not drink alcohol or use drugs.  Allergies   Allergen Reactions   • Ceclor [Cefaclor]    • Cephalosporins    • Risperidone And Related      No current outpatient medications on file.    Physical Exam:  Vitals:    02/27/19 1414   BP: 100/60   Pulse: 89   SpO2: 98%     Body mass index is 14.16 kg/m².    GEN: alert, appears stated age and cooperative  Body Habitus: thin  JVP: 6 cm of water at 45 degrees HJR: absent      Heart rate: normal  Heart Rhythm: regular     Heart Sounds: S1: normal intensity  S2: normal intensity  S3: absent   S4: absent  Opening Snap: absent  A2-OS:  N/A  Pericardial rub: absent    Ejection click: None      Murmurs: Systolic: none  Diastolic: none      DATA REVIEWED:         Results for orders placed in visit on 08/14/17   Adult Transthoracic Echo Complete    Narrative · Left ventricular systolic function is normal.  · Left ventricular diastolic function is normal.  · All left ventricular wall segments contract normally  · Estimated EF appears to be in the range of 61 - 65%.              Assessment/Plan       Diagnosis Plan   1. Precordial pain.  She's had a normal chest x-ray in the past, as well as a normal TTE.  It's difficult to delineate her chest tightness from palpitations. She does not need an ischemia evaluation at this  point.  I'm more concerned that her chest tightness is being related to possibly OH  · As below    2. Dizziness.  I could not elicit a postural component.  ALEJANDRA did not show OH, but did show postural tachycardia.  I am still not certain what to make of this.  It is possible that she does have POTS and we simply did not capture any orthostatic hypotension during her tilt table.  I recommended consultation with EP.   EP consult   3. Palpitations with essentially normal monitor.  Average heart rate was normal.  No arrhythmias.  She does have increased cardiac awareness that correlated with sinus mechanism only.   As above         Return She will transition care to Dr. Alejandra.

## 2019-03-18 DIAGNOSIS — R00.2 PALPITATION: Primary | ICD-10-CM

## 2019-03-19 DIAGNOSIS — R00.2 PALPITATION: Primary | ICD-10-CM

## 2019-03-20 ENCOUNTER — OFFICE VISIT (OUTPATIENT)
Dept: CARDIOLOGY | Facility: CLINIC | Age: 24
End: 2019-03-20

## 2019-03-20 VITALS
WEIGHT: 91 LBS | SYSTOLIC BLOOD PRESSURE: 108 MMHG | BODY MASS INDEX: 14.28 KG/M2 | HEART RATE: 67 BPM | HEIGHT: 67 IN | DIASTOLIC BLOOD PRESSURE: 62 MMHG

## 2019-03-20 DIAGNOSIS — R42 DIZZINESS: ICD-10-CM

## 2019-03-20 DIAGNOSIS — R00.2 PALPITATION: Primary | ICD-10-CM

## 2019-03-20 DIAGNOSIS — G90.A POTS (POSTURAL ORTHOSTATIC TACHYCARDIA SYNDROME): ICD-10-CM

## 2019-03-20 PROCEDURE — 99204 OFFICE O/P NEW MOD 45 MIN: CPT | Performed by: INTERNAL MEDICINE

## 2019-03-20 PROCEDURE — 93000 ELECTROCARDIOGRAM COMPLETE: CPT | Performed by: INTERNAL MEDICINE

## 2019-03-20 NOTE — PROGRESS NOTES
Kathya Montano  23 y.o. female    03/20/2019  1. Palpitation    2. Dizziness    3. POTS (postural orthostatic tachycardia syndrome)        History of Present Illness:    Patient's Body mass index is 14.25 kg/m². BMI is below normal parameters. Recommendations include: referral to primary care.      23 years old female patient initially evaluated by  subsequent with Dr. Carey with a long-standing history of postural syncope, or raynaud,s, palpitation with undefined mechanism and possible dizziness.  Her blood pressure running on the lower side I will seen the documentation of systolic blood pressure ranged from 100-108 with associated symptom of dizziness and one episode of postural syncope, chest discomfort with associated tachycardia.  Head up tilt test performed and the findings likely consistent with postural orthostatic tachycardia mechanism.  Fortunately she does not smoke.  Normal left and systolic function and previous echo in 2017 and monitor had episode of intermittent sinus tachycardia with rare PACs.  EKG sinus rhythm at the time of evaluation  ZIO PATCH 1/2019  Study Description Monitor placed on patient on 1/9/2019 at 10:52 CST. The monitor was scanned on 1/31/2019. The patient was monitored for 14 days 0 hours. Indications for this exam include palpitations. Average HR: 79. Min HR: 43. Max HR: 170.   Study Findings Patient diary submitted.Palpitations was reported during the monitoring period. Palpitations had no correlations. Patient reported occasional episodes of palpitations. The diary states 10 episodes occurred. No complications noted. The predominant rhythm noted during the testing period was sinus rhythm. Premature atrial contractions occured rarely. There was no evidence of atrial arrhythmias. There were no episodes of supraventricular tachycardia. Premature ventricular contractions occured rarely. There were no episodes of ventricular tachycardia. Sinoatrial node conduction  was normal. No atrioventricular block noted.   Study Impressions A normal monitor study.     HUT 1/7/19  · Results consistent with postural tachycardia syndrome without evidence of hypotension    8/2017 ECHO  · Left ventricular systolic function is normal.  · Left ventricular diastolic function is normal.  · All left ventricular wall segments contract normally  · Estimated EF appears to be in the range of 61 - 65%  SUBJECTIVE:    Allergies   Allergen Reactions   • Ceclor [Cefaclor]    • Cephalosporins    • Risperidone And Related          Past Medical History:   Diagnosis Date   • Chest tightness          History reviewed. No pertinent surgical history.      Family History   Problem Relation Age of Onset   • Asthma Maternal Uncle    • Diabetes Maternal Uncle    • Asthma Maternal Grandmother    • Diabetes Maternal Grandmother    • Cancer Maternal Grandfather          Social History     Socioeconomic History   • Marital status: Single     Spouse name: Not on file   • Number of children: Not on file   • Years of education: Not on file   • Highest education level: Not on file   Tobacco Use   • Smoking status: Never Smoker   • Smokeless tobacco: Never Used   Substance and Sexual Activity   • Alcohol use: No   • Drug use: No   • Sexual activity: No         No current outpatient medications on file.     No current facility-administered medications for this visit.            Review of Systems:     Constitutional:  Denies recent weight loss, weight gain, fever or chills, no change in exercise tolerance.     HENT:  Denies any hearing loss, epistaxis, hoarseness, or difficulty speaking.     Eyes: No blurring    Respiratory:  Denies dyspnea with exertion,no cough, wheezing, or hemoptysis.     Cardiovascular: See H&P.     Gastrointestinal:  Denies change in bowel habits, dyspepsia, ulcer disease, hematochezia, or melena.     Endocrine: Negative for cold intolerance, heat intolerance, polydipsia, polyphagia and polyuria. Denies  "any history of weight change, polydipsia, polyuria.     Genitourinary: Negative.      Musculoskeletal: Denies any history of arthritic symptoms or back problems.     Skin:  Denies any change in hair or nails, rashes, or skin lesions.     Allergic/Immunologic: Negative.  Negative for environmental allergies, food allergies and immunocompromised state.     Neurological:  Denies any history of recurrent headaches, strokes, TIA, or seizure disorder.     Hematological: Denies any food allergies, seasonal allergies, bleeding disorders, or lymphadenopathy.     Psychiatric/Behavioral: Denies any history of depression, substance abuse, or change in cognitive function.       OBJECTIVE:    /62   Pulse 67   Ht 170.2 cm (67\")   Wt 41.3 kg (91 lb)   BMI 14.25 kg/m²       Physical Exam:     Constitutional: Cooperative, alert and oriented, well-developed, well-nourished, in no acute distress.     HENT:   Head: Normocephalic, normal hair patterns, no masses or tenderness.  Ears, Nose, and Throat: No gross abnormalities. No pallor or cyanosis. Dentition good.   Eyes: EOMS intact, PERRL, conjunctivae and lids unremarkable. Fundoscopic exam and visual fields not performed.   Neck: No palpable masses or adenopathy, no thyromegaly, no JVD, carotid pulses are full and equal bilaterally and without  Bruits.     Cardiovascular: Regular rhythm, S1 and S2 normal, no S3 or S4. Apical impulse not displaced. No murmurs, gallops, or rubs detected.     Pulmonary/Chest: Chest: normal symmetry, no tenderness to palpation, normal respiratory excursion, no intercostal retraction, no use of accessory muscles. Pulmonary: Normal breath sounds. No rales or rhonchi.    Abdominal: Abdomen soft, bowel sounds normoactive, no masses, no hepatosplenomegaly, non-tender, no bruits.     Musculoskeletal: No deformities, clubbing, cyanosis, erythema, or edema observed. There are no spinal abnormalities noted. Normal muscle strength and tone. Pulses full " and equal in all extremities, no bruits auscultated.     Neurological: No gross motor or sensory deficits noted, affect appropriate, oriented to time, person, place.     Skin: Warm and dry to the touch, no apparent skin lesions or masses noted.     Psychiatric: She has a normal mood and affect. Her behavior is normal. Judgment and thought content normal.         Procedures      No results found for: WBC, HGB, HCT, MCV, PLT  No results found for: GLUCOSE, BUN, CREATININE, EGFRIFNONA, EGFRIFAFRI, BCR, CO2, CALCIUM, PROTENTOTREF, ALBUMIN, LABIL2, AST, ALT  No results found for: CHOL  No results found for: TRIG  No results found for: HDL  No components found for: LDLCALC  No results found for: LDL  No results found for: HDLLDLRATIO  No components found for: CHOLHDL  No results found for: HGBA1C  Lab Results   Component Value Date    TSH 0.879 08/14/2017    X3ITTSL 6.0 08/14/2017           ASSESSMENT AND PLAN:  #1 palpitations #2 tachycardia with associated symptom with dizziness #3 possibility of pots from clinical description  Clinically, no sign of cardiac decompensation based on the clinical history physical finding.  From clinical descriptions the symptom can be explained on the basis of postural orthostatic tachycardia syndrome and given the blood pressure running at the lower side I advised the patient to increase his salt and water intake close to 60-64 ounces of water in 24-hour with the salt palatable to test.  Some exercises were demonstrated to the patient to increase the tone of upper and lower extremity.  Tilt training was also demonstrated to  patient such as standing against the wall and gradually increasing the time up to 40-45-minute.  We will see her back in 6-month depends on patient clinical conditions.  If there is no significant improvement in the symptom midodrine or droxidopa can be contemplated        Kathya was seen today for new patient.    Diagnoses and all orders for this  visit:    Palpitation    Dizziness    POTS (postural orthostatic tachycardia syndrome)        Gera Alejandra MD  3/20/2019  9:32 AM

## 2019-09-24 ENCOUNTER — OFFICE VISIT (OUTPATIENT)
Dept: CARDIOLOGY | Facility: CLINIC | Age: 24
End: 2019-09-24

## 2019-09-24 VITALS
BODY MASS INDEX: 14.28 KG/M2 | SYSTOLIC BLOOD PRESSURE: 100 MMHG | DIASTOLIC BLOOD PRESSURE: 60 MMHG | HEIGHT: 67 IN | HEART RATE: 78 BPM | WEIGHT: 91 LBS | OXYGEN SATURATION: 100 %

## 2019-09-24 DIAGNOSIS — R00.2 PALPITATION: ICD-10-CM

## 2019-09-24 DIAGNOSIS — R42 DIZZINESS: ICD-10-CM

## 2019-09-24 DIAGNOSIS — G90.A POTS (POSTURAL ORTHOSTATIC TACHYCARDIA SYNDROME): Primary | ICD-10-CM

## 2019-09-24 PROCEDURE — 99213 OFFICE O/P EST LOW 20 MIN: CPT | Performed by: INTERNAL MEDICINE

## 2019-09-24 NOTE — PROGRESS NOTES
Kathya Montano  24 y.o. female    09/24/2019  1. POTS (postural orthostatic tachycardia syndrome)    2. Dizziness    3. Palpitation        History of Present Illness:  Patient's Body mass index is 14.25 kg/m². BMI is below normal parameters. Recommendations include: referral to primary care.    23 years old female patient initially evaluated by  subsequent with Dr. Carey with a long-standing history of postural syncope, or raynaud,s, palpitation with undefined mechanism and possible dizziness.  Her blood pressure running on the lower side I will seen the documentation of systolic blood pressure ranged from 100-108 with associated symptom of dizziness and one episode of postural syncope, chest discomfort with associated tachycardia.  Head up tilt test performed and the findings likely consistent with postural orthostatic tachycardia mechanism.  Fortunately she does not smoke.  Normal left  systolic function and previous echo in 2017 and monitor had episode of intermittent sinus tachycardia with rare PACs.   ZIO PATCH 1/2019  Study Description Monitor placed on patient on 1/9/2019 at 10:52 CST. The monitor was scanned on 1/31/2019. The patient was monitored for 14 days 0 hours. Indications for this exam include palpitations. Average HR: 79. Min HR: 43. Max HR: 170.   Study Findings Patient diary submitted.Palpitations was reported during the monitoring period. Palpitations had no correlations. Patient reported occasional episodes of palpitations. The diary states 10 episodes occurred. No complications noted. The predominant rhythm noted during the testing period was sinus rhythm. Premature atrial contractions occured rarely. There was no evidence of atrial arrhythmias. There were no episodes of supraventricular tachycardia. Premature ventricular contractions occured rarely. There were no episodes of ventricular tachycardia. Sinoatrial node conduction was normal. No atrioventricular block noted.    Study Impressions A normal monitor study.      HUT 1/7/19  · Results consistent with postural tachycardia syndrome without evidence of hypotension     8/2017 ECHO  · Left ventricular systolic function is normal.  · Left ventricular diastolic function is normal.  · All left ventricular wall segments contract normally  · Estimated EF appears to be in the range of 61 - 65%  SUBJECTIVE:        SUBJECTIVE:    Allergies   Allergen Reactions   • Ceclor [Cefaclor]    • Cephalosporins    • Risperidone And Related          Past Medical History:   Diagnosis Date   • Chest tightness          No past surgical history on file.      Family History   Problem Relation Age of Onset   • Asthma Maternal Uncle    • Diabetes Maternal Uncle    • Asthma Maternal Grandmother    • Diabetes Maternal Grandmother    • Cancer Maternal Grandfather          Social History     Socioeconomic History   • Marital status: Single     Spouse name: Not on file   • Number of children: Not on file   • Years of education: Not on file   • Highest education level: Not on file   Tobacco Use   • Smoking status: Never Smoker   • Smokeless tobacco: Never Used   Substance and Sexual Activity   • Alcohol use: No   • Drug use: No   • Sexual activity: No         No current outpatient medications on file.     No current facility-administered medications for this visit.            Review of Systems:     Constitutional:  Denies recent weight loss, weight gain, fever or chills, no change in exercise tolerance.     HENT:  Denies any hearing loss, epistaxis, hoarseness, or difficulty speaking.     Eyes: No blurred    Respiratory:  Denies dyspnea with exertion,no cough, wheezing, or hemoptysis.     Cardiovascular: Negative for palpations, chest pain, orthopnea, PND, peripheral edema, syncope, or claudication.     Gastrointestinal:  Denies change in bowel habits, dyspepsia, ulcer disease, hematochezia, or melena.     Endocrine: Negative for cold intolerance, heat  "intolerance, polydipsia, polyphagia and polyuria. Denies any history of weight change, polydipsia, polyuria.     Genitourinary: Negative.      Musculoskeletal: Denies any history of arthritic symptoms or back problems.     Skin:  Denies any change in hair or nails, rashes, or skin lesions.     Allergic/Immunologic: Negative.  Negative for environmental allergies, food allergies and immunocompromised state.     Neurological:  Denies any history of recurrent headaches, strokes, TIA, or seizure disorder.     Hematological: Denies any food allergies, seasonal allergies, bleeding disorders, or lymphadenopathy.     Psychiatric/Behavioral: Denies any history of depression, substance abuse, or change in cognitive function.       OBJECTIVE:    /60   Pulse 78   Ht 170.2 cm (67.01\")   Wt 41.3 kg (91 lb)   SpO2 100%   BMI 14.25 kg/m²       Physical Exam:     Constitutional: Cooperative, alert and oriented, well-developed, well-nourished, in no acute distress.     HENT:   Head: Normocephalic, normal hair patterns, no masses or tenderness.  Ears, Nose, and Throat: No gross abnormalities. No pallor or cyanosis. Dentition good.   Eyes: EOMS intact, PERRL, conjunctivae and lids unremarkable. Fundoscopic exam and visual fields not performed.   Neck: No palpable masses or adenopathy, no thyromegaly, no JVD, carotid pulses are full and equal bilaterally and without  Bruits.     Cardiovascular: Regular rhythm, S1 and S2 normal, no S3 or S4. Apical impulse not displaced. No murmurs, gallops, or rubs detected.     Pulmonary/Chest: Chest: normal symmetry, no tenderness to palpation, normal respiratory excursion, no intercostal retraction, no use of accessory muscles. Pulmonary: Normal breath sounds. No rales or rhonchi.    Abdominal: Abdomen soft, bowel sounds normoactive, no masses, no hepatosplenomegaly, non-tender, no bruits.     Musculoskeletal: No deformities, clubbing, cyanosis, erythema, or edema observed. There are no " spinal abnormalities noted. Normal muscle strength and tone. Pulses full and equal in all extremities, no bruits auscultated.     Neurological: No gross motor or sensory deficits noted, affect appropriate, oriented to time, person, place.     Skin: Warm and dry to the touch, no apparent skin lesions or masses noted.     Psychiatric: She has a normal mood and affect. Her behavior is normal. Judgment and thought content normal.         Procedures      No results found for: WBC, HGB, HCT, MCV, PLT  No results found for: GLUCOSE, BUN, CREATININE, EGFRIFNONA, EGFRIFAFRI, BCR, CO2, CALCIUM, PROTENTOTREF, ALBUMIN, LABIL2, AST, ALT  No results found for: CHOL  No results found for: TRIG  No results found for: HDL  No components found for: LDLCALC  No results found for: LDL  No results found for: HDLLDLRATIO  No components found for: CHOLHDL  No results found for: HGBA1C  Lab Results   Component Value Date    TSH 0.879 08/14/2017    [id number] 6.0 08/14/2017           ASSESSMENT AND PLAN:  #1 palpitations #2 tachycardia with associated symptom with dizziness #3 POTS clinical description  Clinically, no sign of cardiac decompensation based on the clinical history physical finding.  From clinical descriptions the symptom can be explained on the basis of postural orthostatic tachycardia syndrome and given the blood pressure running at the lower side I advised the patient to increase his salt and water intake close to 60-64 ounces of water in 24-hour with the salt palatable to test.  Some exercises were demonstrated to the patient to increase the tone of upper and lower extremity.  Tilt training was also demonstrated to  patient previously.  The patient complained to recommendation and there is no further recurrence of light and dizziness or palpitation.    Kathya was seen today for follow-up.    Diagnoses and all orders for this visit:    POTS (postural orthostatic tachycardia syndrome)    Dizziness    Palpitation        Gera  MD Ny  9/24/2019  3:07 PM

## 2020-04-21 ENCOUNTER — OFFICE VISIT (OUTPATIENT)
Dept: CARDIOLOGY | Facility: CLINIC | Age: 25
End: 2020-04-21

## 2020-04-21 VITALS
WEIGHT: 91 LBS | BODY MASS INDEX: 14.28 KG/M2 | HEIGHT: 67 IN | SYSTOLIC BLOOD PRESSURE: 115 MMHG | DIASTOLIC BLOOD PRESSURE: 65 MMHG

## 2020-04-21 DIAGNOSIS — R42 DIZZINESS: ICD-10-CM

## 2020-04-21 DIAGNOSIS — G90.A POTS (POSTURAL ORTHOSTATIC TACHYCARDIA SYNDROME): ICD-10-CM

## 2020-04-21 DIAGNOSIS — R00.2 PALPITATION: Primary | ICD-10-CM

## 2020-04-21 PROCEDURE — 99442 PR PHYS/QHP TELEPHONE EVALUATION 11-20 MIN: CPT | Performed by: INTERNAL MEDICINE

## 2020-04-21 NOTE — PROGRESS NOTES
Kathya Montano  24 y.o. female    4/21/2020     1. Palpitation    2. POTS (postural orthostatic tachycardia syndrome)    3. Dizziness        History of Present Illness:  This visit has been rescheduled as a phone visit to comply with patient safety concerns in accordance with CDC recommendations. Total time of discussion was 15 minutes.    You have chosen to receive care through a telephone visit. Do you consent to use a telephone visit for your medical care today? Yes      24 years old female patient presented for telephone offce visit initially with a significant improvement in quality of life with change in lifestyle and previously evaluated by  subsequent with Dr. Carey with a long-standing history of postural syncope, palpitation with undefined mechanism   Her blood pressure run on the lower side with documentation of systolic blood pressure ranged from 100-108 with associated symptom of dizziness and one episode of postural syncope, chest discomfort with associated tachycardia.  Head up tilt test performed and the findings likely consistent with postural orthostatic tachycardia mechanism.  Fortunately she does not smoke.  Normal left  systolic function and previous echo in 2017 and monitor had episode of intermittent sinus tachycardia with rare PACs.       ZIO PATCH 1/2019  Study Description Monitor placed on patient on 1/9/2019 at 10:52 CST. The monitor was scanned on 1/31/2019. The patient was monitored for 14 days 0 hours. Indications for this exam include palpitations. Average HR: 79. Min HR: 43. Max HR: 170.   Study Findings Patient diary submitted.Palpitations was reported during the monitoring period. Palpitations had no correlations. Patient reported occasional episodes of palpitations. The diary states 10 episodes occurred. No complications noted. The predominant rhythm noted during the testing period was sinus rhythm. Premature atrial contractions occured rarely. There was no  evidence of atrial arrhythmias. There were no episodes of supraventricular tachycardia. Premature ventricular contractions occured rarely. There were no episodes of ventricular tachycardia. Sinoatrial node conduction was normal. No atrioventricular block noted.   Study Impressions A normal monitor study.      HUT 1/7/19  · Results consistent with postural tachycardia syndrome without evidence of hypotension     8/2017 ECHO  · Left ventricular systolic function is normal.  · Left ventricular diastolic function is normal.  · All left ventricular wall segments contract normally  · Estimated EF appears to be in the range of 61 - 65%  SUBJECTIVE:        SUBJECTIVE:    Allergies   Allergen Reactions   • Ceclor [Cefaclor]    • Cephalosporins    • Risperidone And Related          Past Medical History:   Diagnosis Date   • Chest tightness          No past surgical history on file.      Family History   Problem Relation Age of Onset   • Asthma Maternal Uncle    • Diabetes Maternal Uncle    • Asthma Maternal Grandmother    • Diabetes Maternal Grandmother    • Cancer Maternal Grandfather          Social History     Socioeconomic History   • Marital status: Single     Spouse name: Not on file   • Number of children: Not on file   • Years of education: Not on file   • Highest education level: Not on file   Tobacco Use   • Smoking status: Never Smoker   • Smokeless tobacco: Never Used   Substance and Sexual Activity   • Alcohol use: No   • Drug use: No   • Sexual activity: Never         No current outpatient medications on file.     No current facility-administered medications for this visit.            Review of Systems:     Constitutional:  Denies recent weight loss, weight gain, fever or chills, no change in exercise tolerance.     HENT:  Denies any hearing loss, epistaxis, hoarseness, or difficulty speaking.     Eyes: No blurred    Respiratory:  Denies dyspnea with exertion,no cough, wheezing, or hemoptysis.     Cardiovascular  see H&P    Gastrointestinal:  Denies change in bowel habits, dyspepsia, ulcer disease, hematochezia, or melena.     Endocrine: Negative for cold intolerance, heat intolerance, polydipsia, polyphagia and polyuria. Denies any history of weight change, polydipsia, polyuria.           Musculoskeletal: Denies any history of arthritic symptoms or back problems.           OBJECTIVE:    There were no vitals taken for this visit.      Physical Exam:      No physical exam as presented for telephone offce visit        Procedures      No results found for: WBC, HGB, HCT, MCV, PLT  No results found for: GLUCOSE, BUN, CREATININE, EGFRIFNONA, EGFRIFAFRI, BCR, CO2, CALCIUM, PROTENTOTREF, ALBUMIN, LABIL2, BILIRUBIN, AST, ALT  No results found for: CHOL  No results found for: TRIG  No results found for: HDL  No components found for: LDLCALC  No results found for: LDL  No results found for: HDLLDLRATIO  No components found for: CHOLHDL  No results found for: HGBA1C  Lab Results   Component Value Date    TSH 0.879 08/14/2017    [id number] 6.0 08/14/2017           ASSESSMENT AND PLAN:  #1 palpitations   #2 tachycardia with associated symptom with dizziness   #3 POTS clinical description  Clinically no sign of cardiac decompensation and patient is compliant tosalt and water intake close to 60-64 ounces of water in 24-hour with the salt palatable to test.    Previously some exercises were demonstrated to the patient to increase the tone of upper and lower extremity and Tilt training was  demonstrated to  patient previously.  The patient complained to recommendation and there is no further recurrence of light and dizziness or palpitation.    Diagnoses and all orders for this visit:    Palpitation    POTS (postural orthostatic tachycardia syndrome)    Dizziness        Gera Alejandra MD  4/21/2020  09:38

## 2020-12-01 ENCOUNTER — OFFICE VISIT (OUTPATIENT)
Dept: CARDIOLOGY | Facility: CLINIC | Age: 25
End: 2020-12-01

## 2020-12-01 VITALS — WEIGHT: 91 LBS | BODY MASS INDEX: 14.28 KG/M2 | HEIGHT: 67 IN

## 2020-12-01 DIAGNOSIS — R00.2 PALPITATION: Primary | ICD-10-CM

## 2020-12-01 DIAGNOSIS — G90.A POTS (POSTURAL ORTHOSTATIC TACHYCARDIA SYNDROME): ICD-10-CM

## 2020-12-01 PROCEDURE — 99442 PR PHYS/QHP TELEPHONE EVALUATION 11-20 MIN: CPT | Performed by: INTERNAL MEDICINE

## 2020-12-01 NOTE — PROGRESS NOTES
Kathya Montano  25 y.o. female    12/1/2020     1. Palpitation    2. POTS (postural orthostatic tachycardia syndrome)        History of Present Illness:  This visit has been rescheduled as a phone visit to comply with patient safety concerns in accordance with CDC recommendations. Total time of discussion was 15 minutes.    You have chosen to receive care through a telephone visit. Do you consent to use a telephone visit for your medical care today? Yes      25 years old female patient presented for telephone offce visit initially with a significant improvement in quality of life with change in lifestyle and previously evaluated by  subsequent with Dr. Carey with a long-standing history of postural syncope, palpitation with undefined mechanism   Her blood pressure run on the lower side with documentation of systolic blood pressure ranged from 100-108 with associated symptom of dizziness and one episode of postural syncope, chest discomfort with associated tachycardia.  Head up tilt test performed and the findings likely consistent with postural orthostatic tachycardia mechanism.  Fortunately she does not smoke.  Normal left  systolic function and previous echo in 2017 and monitor had episode of intermittent sinus tachycardia with rare PACs.       ZIO PATCH 1/2019  Study Description Monitor placed on patient on 1/9/2019 at 10:52 CST. The monitor was scanned on 1/31/2019. The patient was monitored for 14 days 0 hours. Indications for this exam include palpitations. Average HR: 79. Min HR: 43. Max HR: 170.   Study Findings Patient diary submitted.Palpitations was reported during the monitoring period. Palpitations had no correlations. Patient reported occasional episodes of palpitations. The diary states 10 episodes occurred. No complications noted. The predominant rhythm noted during the testing period was sinus rhythm. Premature atrial contractions occured rarely. There was no evidence of atrial  arrhythmias. There were no episodes of supraventricular tachycardia. Premature ventricular contractions occured rarely. There were no episodes of ventricular tachycardia. Sinoatrial node conduction was normal. No atrioventricular block noted.   Study Impressions A normal monitor study.      HUT 1/7/19  · Results consistent with postural tachycardia syndrome without evidence of hypotension     8/2017 ECHO  · Left ventricular systolic function is normal.  · Left ventricular diastolic function is normal.  · All left ventricular wall segments contract normally  · Estimated EF appears to be in the range of 61 - 65%  SUBJECTIVE:        SUBJECTIVE:    Allergies   Allergen Reactions   • Ceclor [Cefaclor]    • Cephalosporins    • Risperidone And Related          Past Medical History:   Diagnosis Date   • Chest tightness          History reviewed. No pertinent surgical history.      Family History   Problem Relation Age of Onset   • Asthma Maternal Uncle    • Diabetes Maternal Uncle    • Asthma Maternal Grandmother    • Diabetes Maternal Grandmother    • Cancer Maternal Grandfather          Social History     Socioeconomic History   • Marital status: Single     Spouse name: Not on file   • Number of children: Not on file   • Years of education: Not on file   • Highest education level: Not on file   Tobacco Use   • Smoking status: Never Smoker   • Smokeless tobacco: Never Used   Substance and Sexual Activity   • Alcohol use: No   • Drug use: No   • Sexual activity: Never         No current outpatient medications on file.     No current facility-administered medications for this visit.            Review of Systems:     Constitutional:  Denies recent weight loss, weight gain, fever or chills, no change in exercise tolerance.     HENT:  Denies any hearing loss, epistaxis, hoarseness, or difficulty speaking.     Eyes: No blurred    Respiratory:  Denies dyspnea with exertion,no cough, wheezing, or hemoptysis.     Cardiovascular see  H&P    Gastrointestinal:  Denies change in bowel habits, dyspepsia, ulcer disease, hematochezia, or melena.     Endocrine: Negative for cold intolerance, heat intolerance, polydipsia, polyphagia and polyuria. Denies any history of weight change, polydipsia, polyuria.           Musculoskeletal: Denies any history of arthritic symptoms or back problems.           OBJECTIVE:    There were no vitals taken for this visit.      Physical Exam:      No physical exam as presented for telephone offce visit        Procedures      No results found for: WBC, HGB, HCT, MCV, PLT  No results found for: GLUCOSE, BUN, CREATININE, EGFRIFNONA, EGFRIFAFRI, BCR, CO2, CALCIUM, PROTENTOTREF, ALBUMIN, LABIL2, BILIRUBIN, AST, ALT  No results found for: CHOL  No results found for: TRIG  No results found for: HDL  No components found for: LDLCALC  No results found for: LDL  No results found for: HDLLDLRATIO  No components found for: CHOLHDL  No results found for: HGBA1C  Lab Results   Component Value Date    TSH 0.879 08/14/2017    [id number] 6.0 08/14/2017           ASSESSMENT AND PLAN:  #1 palpitations   #2 tachycardia with associated symptom with dizziness   #3 POTS clinical description  Patient doing well from cardiac point of view.  No symptom of palpitation or postural dizziness reported.  She is compliant with her salt and water intake and exercise which was demonstrated the patient increase the tone of upper and lower extremity and passive tilt training.    Diagnoses and all orders for this visit:    1. Palpitation (Primary)    2. POTS (postural orthostatic tachycardia syndrome)        Gera Alejandra MD  12/1/2020  15:11 CST

## 2020-12-09 ENCOUNTER — HOSPITAL ENCOUNTER (EMERGENCY)
Facility: HOSPITAL | Age: 25
Discharge: HOME | End: 2020-12-09
Attending: EMERGENCY MEDICINE | Admitting: EMERGENCY MEDICINE

## 2020-12-09 VITALS
DIASTOLIC BLOOD PRESSURE: 81 MMHG | WEIGHT: 90 LBS | HEART RATE: 87 BPM | OXYGEN SATURATION: 98 % | BODY MASS INDEX: 14.12 KG/M2 | SYSTOLIC BLOOD PRESSURE: 132 MMHG | HEIGHT: 67 IN | TEMPERATURE: 98.3 F | RESPIRATION RATE: 18 BRPM

## 2020-12-09 DIAGNOSIS — T16.1XXA FOREIGN BODY OF RIGHT EAR, INITIAL ENCOUNTER: ICD-10-CM

## 2020-12-09 DIAGNOSIS — Z00.00 NORMAL EXAM: Primary | ICD-10-CM

## 2020-12-09 PROCEDURE — 99282 EMERGENCY DEPT VISIT SF MDM: CPT
